# Patient Record
Sex: FEMALE | Race: WHITE
[De-identification: names, ages, dates, MRNs, and addresses within clinical notes are randomized per-mention and may not be internally consistent; named-entity substitution may affect disease eponyms.]

---

## 2017-06-22 ENCOUNTER — HOSPITAL ENCOUNTER (EMERGENCY)
Dept: HOSPITAL 17 - NEPD | Age: 62
LOS: 1 days | Discharge: HOME | End: 2017-06-23
Payer: COMMERCIAL

## 2017-06-22 VITALS
SYSTOLIC BLOOD PRESSURE: 126 MMHG | DIASTOLIC BLOOD PRESSURE: 73 MMHG | HEART RATE: 81 BPM | OXYGEN SATURATION: 97 % | RESPIRATION RATE: 16 BRPM | TEMPERATURE: 98 F
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
## 2017-06-22 DIAGNOSIS — S20.212A: ICD-10-CM

## 2017-06-22 DIAGNOSIS — S02.2XXA: Primary | ICD-10-CM

## 2017-06-22 PROCEDURE — 71010: CPT

## 2017-06-22 PROCEDURE — L0150 CERV SEMI-RIG ADJ MOLDED CHN: HCPCS

## 2017-06-22 PROCEDURE — 72125 CT NECK SPINE W/O DYE: CPT

## 2017-06-22 PROCEDURE — 70486 CT MAXILLOFACIAL W/O DYE: CPT

## 2017-06-22 PROCEDURE — 70450 CT HEAD/BRAIN W/O DYE: CPT

## 2017-06-22 PROCEDURE — 72072 X-RAY EXAM THORAC SPINE 3VWS: CPT

## 2017-06-22 PROCEDURE — 99284 EMERGENCY DEPT VISIT MOD MDM: CPT

## 2017-06-22 NOTE — RADRPT
EXAM DATE/TIME:  06/22/2017 23:14 

 

[facility] COMPARISON:     

No previous studies available for comparison.

 

                     

INDICATIONS :     

Patient fell tonight landing on upper back area. No chest complaints. 

                     

 

MEDICAL HISTORY :     

None.          

 

SURGICAL HISTORY :     

None.   

 

ENCOUNTER:     

Initial                                        

 

ACUITY:     

1 day      

 

PAIN SCORE:     

0/10

 

LOCATION:      

chest 

 

FINDINGS:     

A single view of the chest demonstrates the lungs to be symmetrically aerated without evidence of mas
s, infiltrate or effusion. Minimal linear density left lung base. The cardiomediastinal contours are 
unremarkable.  Osseous structures are intact.

 

CONCLUSION:     

1. Left basilar subsegmental atelectasis.

 

 

 

 Migue Kilpatrick MD on June 22, 2017 at 23:34           

Board Certified Radiologist.

 This report was verified electronically.

## 2017-06-22 NOTE — PD
HPI


Chief Complaint:  Injury


Time Seen by Provider:  22:44


Travel History


International Travel<30 days:  No


Contact w/Intl Traveler<30days:  No


Traveled to known affect area:  No





History of Present Illness


HPI


62-year-old female here for evaluation of nose pain, head pain, neck pain, and 

left lateral thoracic pain after mechanical trip and fall.  The patient was at 

a class for work at [employer] when she states she tripped 

on a speed bump.  She states that she fell forward landing directly onto her 

nose.  She denies LOC.  She is not on any antiplatelets or anticoagulants.  She 

did have a moderate amount of bleeding from her nose.  She is having some pain 

in her lower neck that radiates to her bilateral shoulders and upper back with 

movements.  She was able to ambulate after the fall and drove herself to the 

emergency department.  She denies nausea or vomiting.  No visual disturbances.  

No paresthesias or motor deficits.  Pain is moderate.





PFSH


Past Medical History


Cancer:  No


Cardiovascular Problems:  Yes (MITRAL VALVE PROLAPSE)


Diabetes:  No


Diminished Hearing:  No


Endocrine:  Yes


Gastrointestinal Disorders:  Yes (GI VARICES, CIRRHOSIS)


Genitourinary:  No


Hepatitis:  Yes (HEP C3 - 2001)


Hiatal Hernia:  No


Immune Disorder:  No


Medical other:  Yes (HX ESOPHAGEAL & ABDOMINAL VARICES )


Musculoskeletal:  No


Neurologic:  No


Psychiatric:  No


Respiratory:  No


Thyroid Disease:  Yes ("UNDERACTIVE")


Tetanus Vaccination:  Unknown





Past Surgical History


Abdominal Surgery:  No


AICD:  No


Body Medical Devices:  NONE


Cardiac Surgery:  No


Endocrine Surgery:  No


Eye Surgery:  No


Genitourinary Surgery:  No


Gynecologic Surgery:  No


Joint Replacement:  No


Oral Surgery:  Yes (TONSILLECTOMY )


Pacemaker:  No


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 

DUCT)


Other Surgery:  Yes (PROCEDURE TO REPAIR ESOPHAGEAL & ABDOMINAL VARICES )





Social History


Alcohol Use:  No


Tobacco Use:  Yes (PT 'VAPES')


Substance Use:  No





Allergies-Medications


(Allergen,Severity, Reaction):  


Coded Allergies:  


     No Known Allergies (Unverified , 6/22/17)


Reported Meds & Prescriptions





Reported Meds & Active Scripts


Active


Levothyroxine (Levothyroxine Sodium) 50 Mcg Tab 50 Mcg PO DAILY


Spironolactone 50 Mg Tab 50 Mg PO BIDPC


Furosemide 40 Mg Tab 80 Mg PO BID


Reported


Selenium 50 Mcg Tab 50 Mg PO DAILY


Probiotic & Acidophilus F (Probiotic Product) 1 Cap Cap 1 Cap PO DAILY


Potassium 99 Mg Tab 99 Mg PO TID


Multivitamin Adults (Multiple Vitamins W/ Minerals) 1 Tab 1 Tab PO DAILY


Milk Thistle 175 Mg Cap 175 Mg PO TID


B12-Active (Methylcobalamin) 1 Mg Chew 1 Mg CHEW BID


Metamucil Smooth Texture (Psyllium Hydrophilic Mucilloid) 28.3 % Pow 1 Scoop PO 

HS PRN


     1 rounded TEASPOON in 8 oz of liquid at the first sign of


     irregularity.


Melatonin-Pyridoxine 1-10 Mg Tab 1 Tab PO HS


Lecithin 1,200 Mg Cap   


Glucosamine-Chondroitin (Misc Natural Products) 1 Cap 1 Cap PO TID


Odor Free Garlic (Garlic) 100 Mg Tab   


Fish Oil 1000 mg (Omega-3 Fatty Acids) 1 Cap Cap 1 Cap PO BID


Calcium 500/Vitamin D3 (Calcium Carbonate-Cholecalciferol) 500-400 Mg-Unit Tab 

1 Tab PO BID


Ascorbic Acid 500 Mg Tab 500 Mg PO TID








Review of Systems


Except as stated in HPI:  all other systems reviewed are Neg





Physical Exam


Narrative


GENERAL: Well-developed, well-nourished, awake, alert, GCS 15, no acute 

distress.


SKIN: Focused skin assessment warm/dry.  Mild ecchymosis to bridge of nose.  No 

lacerations or abrasions.


HEAD: Normocephalic.  Mild edema to bridge of nose with overlying ecchymosis 

and diffuse tenderness.  No craniofacial step-offs.


EYES: Pupils equal, round, 3 mm, reactive to light.  EOMI.  No scleral icterus. 

No injection or drainage. 


ENT: Dry blood in right anterior naris.  Mucous membranes pink and moist.


NECK: Trachea midline. No JVD.  There is midline cervical spine tenderness over 

C5 and C6 without step-off.


CARDIOVASCULAR: Regular rate and rhythm.  


RESPIRATORY: No accessory muscle use. Clear to auscultation. Breath sounds 

equal bilaterally. 


GASTROINTESTINAL: Abdomen soft, non-tender, nondistended. 


MUSCULOSKELETAL: No obvious deformities. No clubbing.  No cyanosis.  No edema.  

Left lateral chest wall tenderness without crepitus, without step-off, without 

paradoxical chest wall movement.  Mild midline upper thoracic spine tenderness 

without step-off.  The rest of the patient's T-spine and L-spine spine are 

without step-off or tenderness.  All joints and extremities are without 

deformity, without tenderness, with normal ROM.


NEUROLOGICAL: Awake and alert. No obvious cranial nerve deficits.  Motor 

grossly within normal limits. Normal speech.


PSYCHIATRIC: Appropriate mood and affect; insight and judgment normal.





Data


Data


Last Documented VS





Vital Signs








  Date Time  Temp Pulse Resp B/P Pulse Ox O2 Delivery O2 Flow Rate FiO2


 


6/22/17 22:38   18     


 


6/22/17 21:53 98.0 81  126/73 97 Room Air  








Orders





 Ct Brain W/O Iv Contrast(Rout) (6/22/17 )


Ct Cerv Spine W/O Contrast (6/22/17 )


Ct Facial Bones W/O Iv Cont (6/22/17 )


Chest, Single Ap (6/22/17 )


Spine, Thoracic-Ap/Lat/Sw(3vw) (6/22/17 )


Ibuprofen (Motrin) (6/22/17 23:00)


^ [place] Collar (6/22/17 22:56)


Collar [place] (6/22/17 )








MDM


Medical Decision Making


Medical Screen Exam Complete:  Yes


Emergency Medical Condition:  Yes


Differential Diagnosis


Facial bone fracture, intracranial trauma, cervical spine injury, thoracic 

spine injury


Narrative Course


Initial vital signs show heart rate 81, blood pressure 126/73, pulse ox 97% on 

room air, oral temp of 98F.





Chest x-ray:


Left basilar subsegmental atelectasis.





X-ray thoracic spine:


Slight dextrocurvature otherwise unremarkable thoracic spine.





CT brain:


No acute intracranial disease.





CT facial bones:


CONCLUSION:     


Right nasal fracture.





CT C spine:


CONCLUSION:     


1. No fracture.


2. Degenerative changes from C4-C6.





The patient was made aware of all findings.  She is resting comfortably.  No 

respiratory distress.  She does have some left lateral chest wall tenderness 

without crepitus, without step-off, without paradoxical chest wall movements.  

She refused any pain medication here in the emergency department.  She is 

stable for discharge home with outpatient follow-up with her primary care 

physician as well as maxillofacial surgeon this week.  She was informed on when 

to return to the emergency department.  She verbalizes understanding and 

agreement with plan.























Diagnosis





 Primary Impression:  


 Fall


 Qualified Code:  W19.XXXA - Fall, initial encounter


 Additional Impressions:  


 Nasal bone fracture


 Qualified Code:  S02.2XXA - Closed fracture of nasal bone, initial encounter


 Chest wall contusion


 Qualified Code:  S20.212A - Chest wall contusion, left, initial encounter


Referrals:  


Dago Kwan MD


1 week


ENT





Marquise Wills DMD


3 days


Craniofacial surgeon





Primary Care Physician


3 days





***Additional Instructions:


Follow-up with your primary care physician this week.


Follow-up with craniofacial surgeon Dr. Wills or a craniofacial surgeon of 

your choice this week.


Follow-up with ENT Dr. Kwan or an ENT of your choice this week.


Return to the emergency department for worsening symptoms or any other concerns 

as discussed.


Scripts


Tizanidine 4 Mg Tab4 Mg PO TID  #20 TAB  Ref 0


   Prov:René Mccauley MD         6/23/17 


Tramadol 50 Mg Tab50 Mg PO Q6H PRN (PAIN) #20 TAB  Ref 0


   Prov:René Mccauley MD         6/23/17


Disposition:  01 DISCHARGE HOME


Condition:  Stable








René Mccauley MD Jun 22, 2017 22:56

## 2017-06-22 NOTE — RADRPT
EXAM DATE/TIME:  06/22/2017 23:15 

 

[facility] COMPARISON:     

No previous studies available for comparison.

 

                     

INDICATIONS :     

Patient fell tonight landing on upper back. Complains of thoracic pain.

                     

 

MEDICAL HISTORY :     

None.          

 

SURGICAL HISTORY :     

None.   

 

ENCOUNTER:     

Initial                                        

 

ACUITY:     

1 day      

 

PAIN SCORE:     

7/10

 

LOCATION:       

T-Spine

 

FINDINGS:     

There is normal alignment of the thoracic vertebral bodies.  Vertebral body height is maintained.  No
 evidence of fracture or subluxation. Slight dextrocurvature. Pedicles are intact at all levels.  The
 paravertebral reflections are not thickened. 

 

CONCLUSION:     

Slight dextrocurvature otherwise unremarkable thoracic spine.

 

 

 

 Migue Kilpatrick MD on June 22, 2017 at 23:35           

Board Certified Radiologist.

 This report was verified electronically.

## 2017-06-23 NOTE — RADRPT
EXAM DATE/TIME:  06/22/2017 23:21 

 

[facility] COMPARISON:     

No previous studies available for comparison.

 

 

INDICATIONS :     

Trauma, fall.

                      

 

RADIATION DOSE:     

10.66 CTDIvol (mGy) 

 

 

 

MEDICAL HISTORY :     

None  

 

SURGICAL HISTORY :      

None. 

 

ENCOUNTER:      

Initial

 

ACUITY:      

1 day

 

PAIN SCALE:      

3/10

 

LOCATION:        

neck 

 

TECHNIQUE:     

Volumetric scanning of the cervical spine was performed. Multiplanar reconstructions in the sagittal,
 coronal and oblique axial planes were performed.   Using automated exposure control and adjustment o
f the mA and/or kV according to patient size, radiation dose was kept as low as reasonably achievable
 to obtain optimal diagnostic quality images.   DICOM format image data is available electronically f
or review and comparison.  

 

FINDINGS:     

 

VERTEBRAE:     

Normal vertebral body height.

 

ALIGNMENT:     

Minimal retrolisthesis C4 on C5 and C5 at C6.

 

C2-C3:  

The bony spinal canal is normal in size.  No evidence of disc bulge or herniation.  The neural forami
na are bilaterally patent.

 

C3-C4:  

The bony spinal canal is normal in size.  No evidence of disc bulge or herniation.  The neural forami
na are bilaterally patent.

 

C4-C5: 

Posterior disc osteophyte complex abuts the ventral thecal sac without canal stenosis. Mild neural fo
raminal narrowing bilaterally.

 

C5-C6:  

Posterior disc osteophyte complex abuts the ventral thecal sac without canal stenosis. Mild neural fo
raminal narrowing bilaterally.

 

C6-C7:  

The bony spinal canal is normal in size.  No evidence of disc bulge or herniation.  The neural forami
na are bilaterally patent.

 

C7-T1:  

The bony spinal canal is normal in size.  No evidence of disc bulge or herniation.  The neural forami
na are bilaterally patent.

 

CONCLUSION:     

1. No fracture.

2. Degenerative changes from C4-C6.

 

 

 

 Migue Kilpatrick MD on June 23, 2017 at 0:08           

Board Certified Radiologist.

 This report was verified electronically.

## 2017-06-23 NOTE — RADRPT
EXAM DATE/TIME:  06/22/2017 23:21 

 

[facility] COMPARISON:     

No previous studies available for comparison.

 

 

INDICATIONS :     

Trauma, fall.

                      

 

RADIATION DOSE:     

26.22 CTDIvol (mGy) 

 

 

 

MEDICAL HISTORY :     

Cardiovascular disease.  

 

SURGICAL HISTORY :      

None. 

 

ENCOUNTER:      

Initial

 

ACUITY:      

1 day

 

PAIN SCALE:      

3/10

 

LOCATION:        

cranial 

 

TECHNIQUE:     

Multiple contiguous axial images were obtained of the head.  Using automated exposure control and adj
ustment of the mA and/or kV according to patient size, radiation dose was kept as low as reasonably a
chievable to obtain optimal diagnostic quality images.   DICOM format image data is available electro
nically for review and comparison.  

 

FINDINGS:     

 

CEREBRUM:     

The ventricles are normal for age.  No evidence of midline shift, mass lesion, hemorrhage or acute in
farction.  No extra-axial fluid collections are seen.

 

POSTERIOR FOSSA:     

The cerebellum and brainstem are intact.  The 4th ventricle is midline.  The cerebellopontine angle i
s unremarkable.

 

EXTRACRANIAL:     

The visualized portion of the orbits is intact.

 

SKULL:     

The calvaria is intact.  No evidence of skull fracture.

 

CONCLUSION:     

No acute intracranial disease.  

 

 

 

 Migue Kilpatrick MD on June 23, 2017 at 0:05           

Board Certified Radiologist.

 This report was verified electronically.

## 2017-06-23 NOTE — RADRPT
EXAM DATE/TIME:  06/22/2017 23:21 

 

[facility] COMPARISON:     

No previous studies available for comparison.

 

 

INDICATIONS :     

Trauma, fall. 

                      

 

RADIATION DOSE:     

45.71 CTDIvol (mGy) 

 

 

MEDICAL HISTORY :     

None  

 

SURGICAL HISTORY :      

None. 

 

ENCOUNTER:      

Initial

 

ACUITY:      

1 day

 

PAIN SCORE:      

6/10

 

LOCATION:        

facial 

 

TECHNIQUE:     

Volumetric scanning of the facial bones was performed.  Using automated exposure control and adjustme
nt of the mA and/or kV according to patient size, radiation dose was kept as low as reasonably achiev
able to obtain optimal diagnostic quality images.  DICOM format image data is available electronicall
y for review and comparison.  

 

FINDINGS:     

 

ORBITS:     

The orbital and infraorbital osseous structures are intact.  The retroconal structures have a normal 
configuration.  No radiopaque foreign bodies are seen.

 

NASAL BONE:     

Displaced and slightly depressed right nasal fracture.

 

ZYGOMATIC ARCHES:     

Symmetric without evidence of fracture.

 

SINUSES:     

The maxillary, ethmoid and frontal sinuses are intact.  No air-fluid levels seen.

 

NASAL CAVITY:     

The nasal septum is intact and midline.  The lacrimal ducts are intact.

 

SOFT TISSUES:     

No radiopaque foreign bodies seen.  No soft-tissue swelling is seen.

 

INTRACRANIAL:     

No intracranial air seen.

 

CRIBIFORM PLATE:     

Grossly intact.

 

CONCLUSION:     

Right nasal fracture.

 

 

 

 Migue Kilpatrick MD on June 23, 2017 at 0:10           

Board Certified Radiologist.

 This report was verified electronically.

## 2018-06-02 ENCOUNTER — HOSPITAL ENCOUNTER (OUTPATIENT)
Dept: HOSPITAL 17 - NEPE | Age: 63
Setting detail: OBSERVATION
LOS: 2 days | Discharge: HOME | End: 2018-06-04
Attending: HOSPITALIST | Admitting: HOSPITALIST
Payer: COMMERCIAL

## 2018-06-02 VITALS
HEART RATE: 70 BPM | TEMPERATURE: 97.9 F | DIASTOLIC BLOOD PRESSURE: 68 MMHG | RESPIRATION RATE: 18 BRPM | SYSTOLIC BLOOD PRESSURE: 118 MMHG | OXYGEN SATURATION: 96 %

## 2018-06-02 VITALS
SYSTOLIC BLOOD PRESSURE: 114 MMHG | HEART RATE: 67 BPM | RESPIRATION RATE: 16 BRPM | DIASTOLIC BLOOD PRESSURE: 72 MMHG | OXYGEN SATURATION: 96 %

## 2018-06-02 VITALS
SYSTOLIC BLOOD PRESSURE: 94 MMHG | TEMPERATURE: 98.2 F | RESPIRATION RATE: 17 BRPM | HEART RATE: 69 BPM | OXYGEN SATURATION: 98 % | DIASTOLIC BLOOD PRESSURE: 60 MMHG

## 2018-06-02 VITALS — DIASTOLIC BLOOD PRESSURE: 56 MMHG | SYSTOLIC BLOOD PRESSURE: 96 MMHG

## 2018-06-02 DIAGNOSIS — Z23: ICD-10-CM

## 2018-06-02 DIAGNOSIS — D68.9: ICD-10-CM

## 2018-06-02 DIAGNOSIS — I95.9: ICD-10-CM

## 2018-06-02 DIAGNOSIS — I34.1: ICD-10-CM

## 2018-06-02 DIAGNOSIS — S01.01XA: ICD-10-CM

## 2018-06-02 DIAGNOSIS — R55: Primary | ICD-10-CM

## 2018-06-02 DIAGNOSIS — E03.9: ICD-10-CM

## 2018-06-02 DIAGNOSIS — E86.0: ICD-10-CM

## 2018-06-02 DIAGNOSIS — I85.00: ICD-10-CM

## 2018-06-02 DIAGNOSIS — F17.200: ICD-10-CM
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
## 2018-06-02 DIAGNOSIS — B19.20: ICD-10-CM

## 2018-06-02 LAB
ALBUMIN SERPL-MCNC: 3.6 GM/DL (ref 3.4–5)
ALP SERPL-CCNC: 167 U/L (ref 45–117)
ALT SERPL-CCNC: 32 U/L (ref 10–53)
AST SERPL-CCNC: 45 U/L (ref 15–37)
BASOPHILS # BLD AUTO: 0 TH/MM3 (ref 0–0.2)
BASOPHILS NFR BLD: 0.8 % (ref 0–2)
BILIRUB SERPL-MCNC: 1.4 MG/DL (ref 0.2–1)
BUN SERPL-MCNC: 14 MG/DL (ref 7–18)
CALCIUM SERPL-MCNC: 9 MG/DL (ref 8.5–10.1)
CHLORIDE SERPL-SCNC: 98 MEQ/L (ref 98–107)
CREAT SERPL-MCNC: 0.94 MG/DL (ref 0.5–1)
EOSINOPHIL # BLD: 0.1 TH/MM3 (ref 0–0.4)
EOSINOPHIL NFR BLD: 3.2 % (ref 0–4)
ERYTHROCYTE [DISTWIDTH] IN BLOOD BY AUTOMATED COUNT: 14.3 % (ref 11.6–17.2)
GFR SERPLBLD BASED ON 1.73 SQ M-ARVRAT: 60 ML/MIN (ref 89–?)
GLUCOSE SERPL-MCNC: 110 MG/DL (ref 74–106)
HCO3 BLD-SCNC: 27.2 MEQ/L (ref 21–32)
HCT VFR BLD CALC: 47.1 % (ref 35–46)
HGB BLD-MCNC: 16.4 GM/DL (ref 11.6–15.3)
INR PPP: 1.3 RATIO
LYMPHOCYTES # BLD AUTO: 0.6 TH/MM3 (ref 1–4.8)
LYMPHOCYTES NFR BLD AUTO: 14.3 % (ref 9–44)
MAGNESIUM SERPL-MCNC: 2 MG/DL (ref 1.5–2.5)
MCH RBC QN AUTO: 35.1 PG (ref 27–34)
MCHC RBC AUTO-ENTMCNC: 34.9 % (ref 32–36)
MCV RBC AUTO: 100.5 FL (ref 80–100)
MONOCYTE #: 0.4 TH/MM3 (ref 0–0.9)
MONOCYTES NFR BLD: 9.4 % (ref 0–8)
NEUTROPHILS # BLD AUTO: 2.9 TH/MM3 (ref 1.8–7.7)
NEUTROPHILS NFR BLD AUTO: 72.3 % (ref 16–70)
PLATELET # BLD: 133 TH/MM3 (ref 150–450)
PMV BLD AUTO: 10.4 FL (ref 7–11)
PROT SERPL-MCNC: 7.8 GM/DL (ref 6.4–8.2)
PROTHROMBIN TIME: 13 SEC (ref 9.8–11.6)
RBC # BLD AUTO: 4.68 MIL/MM3 (ref 4–5.3)
SODIUM SERPL-SCNC: 137 MEQ/L (ref 136–145)
TROPONIN I SERPL-MCNC: 0.02 NG/ML (ref 0.02–0.05)
WBC # BLD AUTO: 4 TH/MM3 (ref 4–11)

## 2018-06-02 PROCEDURE — 97530 THERAPEUTIC ACTIVITIES: CPT

## 2018-06-02 PROCEDURE — 71045 X-RAY EXAM CHEST 1 VIEW: CPT

## 2018-06-02 PROCEDURE — 80048 BASIC METABOLIC PNL TOTAL CA: CPT

## 2018-06-02 PROCEDURE — 83735 ASSAY OF MAGNESIUM: CPT

## 2018-06-02 PROCEDURE — 96374 THER/PROPH/DIAG INJ IV PUSH: CPT

## 2018-06-02 PROCEDURE — 84439 ASSAY OF FREE THYROXINE: CPT

## 2018-06-02 PROCEDURE — 99285 EMERGENCY DEPT VISIT HI MDM: CPT

## 2018-06-02 PROCEDURE — 84443 ASSAY THYROID STIM HORMONE: CPT

## 2018-06-02 PROCEDURE — 96361 HYDRATE IV INFUSION ADD-ON: CPT

## 2018-06-02 PROCEDURE — 93306 TTE W/DOPPLER COMPLETE: CPT

## 2018-06-02 PROCEDURE — 80053 COMPREHEN METABOLIC PANEL: CPT

## 2018-06-02 PROCEDURE — 84481 FREE ASSAY (FT-3): CPT

## 2018-06-02 PROCEDURE — 85610 PROTHROMBIN TIME: CPT

## 2018-06-02 PROCEDURE — 72125 CT NECK SPINE W/O DYE: CPT

## 2018-06-02 PROCEDURE — 97163 PT EVAL HIGH COMPLEX 45 MIN: CPT

## 2018-06-02 PROCEDURE — G0378 HOSPITAL OBSERVATION PER HR: HCPCS

## 2018-06-02 PROCEDURE — 82550 ASSAY OF CK (CPK): CPT

## 2018-06-02 PROCEDURE — 85025 COMPLETE CBC W/AUTO DIFF WBC: CPT

## 2018-06-02 PROCEDURE — 90471 IMMUNIZATION ADMIN: CPT

## 2018-06-02 PROCEDURE — 84484 ASSAY OF TROPONIN QUANT: CPT

## 2018-06-02 PROCEDURE — 12002 RPR S/N/AX/GEN/TRNK2.6-7.5CM: CPT

## 2018-06-02 PROCEDURE — 90714 TD VACC NO PRESV 7 YRS+ IM: CPT

## 2018-06-02 PROCEDURE — 70450 CT HEAD/BRAIN W/O DYE: CPT

## 2018-06-02 PROCEDURE — 85730 THROMBOPLASTIN TIME PARTIAL: CPT

## 2018-06-02 PROCEDURE — 93005 ELECTROCARDIOGRAM TRACING: CPT

## 2018-06-02 RX ADMIN — Medication SCH ML: at 21:00

## 2018-06-02 RX ADMIN — STANDARDIZED SENNA CONCENTRATE AND DOCUSATE SODIUM SCH TAB: 8.6; 5 TABLET, FILM COATED ORAL at 21:00

## 2018-06-02 NOTE — RADRPT
EXAM DATE:  6/2/2018 6:02 PM EDT

AGE/SEX:        63 years / Female



INDICATIONS:  Syncopal episode, fell and hit head, nausea, and vomiting.  



CLINICAL DATA:  This is the patient's initial encounter. Patient reports that signs and symptoms have
 been present for 1 day and indicates a pain score of 6/10. 

                                                                          

MEDICAL/SURGICAL HISTORY:   Cardiovascular disease.  Hepatitis C.  Cirrhosis.  Mitral valve prolapse.
 Hypothyroidism.   None.



RADIATION DOSE:  56.35 CTDI (mGy)







COMPARISON:      [facility], CT BRAIN W/O CONTRAST, 6/22/2017.  .





TECHNIQUE:  CT of the head without contrast.  Using automated exposure control and adjustment of the 
mA and/or kV according to patient size, radiation dose was kept as low as reasonably achievable to ob
tain optimal diagnostic quality images.



FINDINGS: 

Cerebrum:  The ventricles are normal for age.  No evidence of midline shift, mass lesion, hemorrhage 
or acute infarction.  No extraaxial fluid collections are seen.

Posterior Fossa:  The cerebellum and brainstem are intact.  The 4th ventricle is midline.  The cerebe
llopontine angle is unremarkable.

Extracranial:  There is a large left parietal scalp hematoma.

Skull:  The calvaria is intact.  No evidence of skull fracture.







CONCLUSION:

1.  No acute intracranial abnormality.

2.  Left parietal scalp hematoma.



Electronically signed by: Leo Jeter MD  6/2/2018 6:05 PM EDT

## 2018-06-02 NOTE — PD
HPI


Chief Complaint:  Syncope/Near-Syncope


Time Seen by Provider:  16:06


Travel History


International Travel<30 days:  No


Contact w/Intl Traveler<30days:  No


Traveled to known affect area:  No





History of Present Illness


HPI


63-year-old female that presents to the ED for evaluation of syncope.  Patient 

had a syncopal episode today while at work.  Per patient she does not remember 

what happened.  She does not know what happened before this.  She woke up on 

the floor and apparently had a significant head injury.  Patient was put on 

cervical collar by [facility].  She was brought here for evaluation.  She states that 

she has had 2 syncopal episode the past and she was told that she had the 

hydration.  She relates that is the same.  She does have a history of thyroid 

disease.  She states having a headache and some neck pain.  Per patient the 

headache is 7 out of 10.  Denies take any blood thinners.  Has not seen anybody 

for this.  No chest pain or shortness of breath.  No urinary or bowel movement 

issues.  No abdominal pain.  No symptoms preloading to the event.  She does not 

know how long she was down.  No fevers chills or sweats.  No other medical 

issues at this time.  She does appear to be bleeding from a injury to the back 

of the head.  Heart disease secondary to significant blood clotting in the area.





PFSH


Past Medical History


Cancer:  No


Cardiovascular Problems:  Yes (MITRAL VALVE PROLAPSE)


Diabetes:  No


Diminished Hearing:  No


Endocrine:  Yes


Gastrointestinal Disorders:  Yes (GI VARICES, CIRRHOSIS)


Hepatitis:  Yes (HEP C3 - 2001)


Hiatal Hernia:  No


Immune Disorder:  No


Medical other:  Yes (HX ESOPHAGEAL & ABDOMINAL VARICES )


Thyroid Disease:  Yes (hypo)


Tetanus Vaccination:  Unknown


Influenza Vaccination:  Yes





Past Surgical History


Abdominal Surgery:  No


AICD:  No


Cardiac Surgery:  No


Endocrine Surgery:  No


Eye Surgery:  No


Genitourinary Surgery:  No


Gynecologic Surgery:  No


Joint Replacement:  No


Oral Surgery:  Yes (TONSILLECTOMY )


Pacemaker:  No


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 

DUCT)


Tonsillectomy:  Yes


Other Surgery:  Yes (BREAST BIOPSY, REPAIR ESOPHAGEAL & ABDOMINAL VARICES )





Social History


Alcohol Use:  Yes (Wine after work)


Tobacco Use:  Yes ("A couple cigarettes a day")


Substance Use:  No





Allergies-Medications


(Allergen,Severity, Reaction):  


Coded Allergies:  


     No Known Allergies (Unverified  Allergy, Unknown, 6/2/18)


Reported Meds & Prescriptions





Reported Meds & Active Scripts


Active


Furosemide 40 Mg Tab 80 Mg PO BID


Tizanidine (Tizanidine HCl) 4 Mg Tab 4 Mg PO TID


Tramadol (Tramadol HCl) 50 Mg Tab 50 Mg PO Q6H PRN


Levothyroxine (Levothyroxine Sodium) 50 Mcg Tab 50 Mcg PO DAILY


Spironolactone 50 Mg Tab 50 Mg PO BIDPC


Reported


Probiotic & Acidophilus F (Probiotic Product) 1 Cap Cap 1 Cap PO DAILY


Metamucil Smooth Texture (Psyllium Hydrophilic Mucilloid) 28.3 % Pow 1 Scoop PO 

HS PRN


     1 rounded TEASPOON in 8 oz of liquid at the first sign of


     irregularity.


Melatonin-Pyridoxine 1-10 Mg Tab 1 Tab PO HS


Glucosamine-Chondroitin (Misc Natural Products) 1 Cap 1 Cap PO TID


Calcium 500/Vitamin D3 (Calcium Carbonate-Cholecalciferol) 500-400 Mg-Unit Tab 

1 Tab PO BID


Ascorbic Acid 500 Mg Tab 500 Mg PO TID








Review of Systems


Except as stated in HPI:  all other systems reviewed are Neg





Physical Exam


Narrative


GENERAL: 


SKIN: Warm and dry.


HEAD: Atraumatic. Normocephalic.  Patient has a significant hematoma to the 

left occipital head.  Tender to touch.  Some bleeding noted.  About a 4 cm 

laceration to the back of the head with some clotting.


EYES: Pupils equal and round. No scleral icterus. No injection or drainage. 


ENT: No nasal bleeding or discharge.  Mucous membranes pink and moist.  Tongue 

is midline.  No uvula deviation.


NECK: Trachea midline. No JVD. 


CARDIOVASCULAR: Regular rate and rhythm.  No murmurs, S3, S4.


RESPIRATORY: No accessory muscle use. Clear to auscultation. Breath sounds 

equal bilaterally. 


GASTROINTESTINAL: Abdomen soft, non-tender, nondistended. Hepatic and splenic 

margins not palpable. 


MUSCULOSKELETAL: Extremities without clubbing, cyanosis, or edema. No obvious 

deformities.  Full range of motion of the upper and lower extremities 

bilaterally.  2+ pulses bilaterally.


NEUROLOGICAL: Awake and alert. No obvious cranial nerve deficits.  Motor 

grossly within normal limits. Five out of 5 muscle strength in the arms and 

legs.  Normal speech.


PSYCHIATRIC: Appropriate mood and affect; insight and judgment normal.





Data


Data


Last Documented VS





Vital Signs








  Date Time  Temp Pulse Resp B/P (MAP) Pulse Ox O2 Delivery O2 Flow Rate FiO2


 


6/2/18 18:00  67 16 114/72 (86) 96 Room Air  


 


6/2/18 16:01 97.9       








Orders





 Orders


Electrocardiogram (6/2/18 16:12)


Complete Blood Count With Diff (6/2/18 16:12)


Comprehensive Metabolic Panel (6/2/18 16:12)


Ckmb (Isoenzyme) Profile (6/2/18 16:12)


Troponin I (6/2/18 16:12)


Prothrombin Time / Inr (Pt) (6/2/18 16:12)


Act Partial Throm Time (Ptt) (6/2/18 16:12)


Magnesium (Mg) (6/2/18 16:12)


Thyroid Stimulating Hormone (6/2/18 16:12)


Chest, Single Ap (6/2/18 16:12)


Ct Brain W/O Iv Contrast(Rout) (6/2/18 16:12)


Iv Access Insert/Monitor (6/2/18 16:12)


Ecg Monitoring (6/2/18 16:12)


Oximetry (6/2/18 16:12)


Orthostatic Vital Signs (6/2/18 16:12)


Ct Cerv Spine W/O Contrast (6/2/18 )


Sodium Chlor 0.9% 1000 Ml Inj (Ns 1000 M (6/2/18 16:15)


Metoclopramide Inj (Reglan Inj) (6/2/18 16:15)


Tetanus/Diphtheria Tox Adult (Tetanus/Di (6/2/18 16:15)


Wound Care (6/2/18 16:27)


Lidocaine Pf 1% Inj (Xylocaine-Mpf 1% In (6/2/18 18:00)


Place In Observation (6/2/18 )


Vital Signs (Adult) Q4H (6/2/18 18:45)


Cardiac Monitor / Telemetry .CONTINUOUS (6/2/18 18:45)


Sodium Chloride 0.9% Flush (Ns Flush) (6/2/18 18:45)


Sodium Chloride 0.9% Flush (Ns Flush) (6/2/18 21:00)


Naloxone Inj (Narcan Inj) (6/2/18 18:45)


Echo 2d Comp With Doppler (6/2/18 )


Vital Signs (Adult) Q4H (6/2/18 19:02)


Activity Oob With Assistance (6/2/18 19:02)


Cardiac Monitor / Telemetry .CONTINUOUS (6/2/18 19:02)


Intake + Output TEOFILO.QSHIFT (6/2/18 19:02)


Diet Heart Healthy (6/3/18 Breakfast)


Sodium Chlor 0.9% 1000 Ml Inj (Ns 1000 M (6/2/18 19:02)


Sodium Chloride 0.9% Flush (Ns Flush) (6/2/18 19:15)


Sodium Chloride 0.9% Flush (Ns Flush) (6/2/18 21:00)


Comprehensive Metabolic Panel (6/3/18 06:00)


Complete Blood Count With Diff (6/3/18 06:00)


Troponin I (6/3/18 00:00)


Troponin I (6/3/18 06:00)


Prothrombin Time / Inr (Pt) (6/3/18 06:00)


Scd Bilateral/Knee High TEOFILO.BID (6/2/18 19:02)


Jethro Bilateral/Knee High TEOFILO.QSHIFT (6/2/18 19:03)


Acetaminophen (Tylenol) (6/2/18 19:15)


Oxycodone (Roxicodone) (6/2/18 19:15)


Oxycodone (Roxicodone) (6/2/18 19:15)


Docusate Sodium-Senna (Ingris-Colace) (6/2/18 21:00)


Magnesium Hydroxide Liq (Milk Of Magnesi (6/2/18 19:15)


Sennosides (Senokot) (6/2/18 19:15)


Bisacodyl Supp (Dulcolax Supp) (6/2/18 19:15)


Lactulose Liq (Lactulose Liq) (6/2/18 19:15)


Physician Name Changes (6/2/18 )


Sodium Chlor 0.9% 1000 Ml Inj (Ns 1000 M (6/2/18 19:15)


Admit Order (Ed Use Only) (6/2/18 19:16)





Labs





Laboratory Tests








Test


  6/2/18


16:15 6/2/18


17:05


 


White Blood Count 4.0 TH/MM3  


 


Red Blood Count 4.68 MIL/MM3  


 


Hemoglobin 16.4 GM/DL  


 


Hematocrit 47.1 %  


 


Mean Corpuscular Volume 100.5 FL  


 


Mean Corpuscular Hemoglobin 35.1 PG  


 


Mean Corpuscular Hemoglobin


Concent 34.9 % 


  


 


 


Red Cell Distribution Width 14.3 %  


 


Platelet Count 133 TH/MM3  


 


Mean Platelet Volume 10.4 FL  


 


Neutrophils (%) (Auto) 72.3 %  


 


Lymphocytes (%) (Auto) 14.3 %  


 


Monocytes (%) (Auto) 9.4 %  


 


Eosinophils (%) (Auto) 3.2 %  


 


Basophils (%) (Auto) 0.8 %  


 


Neutrophils # (Auto) 2.9 TH/MM3  


 


Lymphocytes # (Auto) 0.6 TH/MM3  


 


Monocytes # (Auto) 0.4 TH/MM3  


 


Eosinophils # (Auto) 0.1 TH/MM3  


 


Basophils # (Auto) 0.0 TH/MM3  


 


CBC Comment DIFF FINAL  


 


Differential Comment   


 


Prothrombin Time 13.0 SEC  


 


Prothromb Time International


Ratio 1.3 RATIO 


  


 


 


Activated Partial


Thromboplast Time 27.6 SEC 


  


 


 


Blood Urea Nitrogen  14 MG/DL 


 


Creatinine  0.94 MG/DL 


 


Random Glucose  110 MG/DL 


 


Total Protein  7.8 GM/DL 


 


Albumin  3.6 GM/DL 


 


Calcium Level  9.0 MG/DL 


 


Magnesium Level  2.0 MG/DL 


 


Alkaline Phosphatase  167 U/L 


 


Aspartate Amino Transf


(AST/SGOT) 


  45 U/L 


 


 


Alanine Aminotransferase


(ALT/SGPT) 


  32 U/L 


 


 


Total Bilirubin  1.4 MG/DL 


 


Sodium Level  137 MEQ/L 


 


Potassium Level  3.7 MEQ/L 


 


Chloride Level  98 MEQ/L 


 


Carbon Dioxide Level  27.2 MEQ/L 


 


Anion Gap  12 MEQ/L 


 


Estimat Glomerular Filtration


Rate 


  60 ML/MIN 


 


 


Total Creatine Kinase  95 U/L 


 


Troponin I  0.02 NG/ML 


 


Thyroid Stimulating Hormone


3rd Gen 


  83.800 uIU/ML 


 











MDM


Medical Decision Making


Medical Screen Exam Complete:  Yes


Emergency Medical Condition:  Yes


Medical Record Reviewed:  Yes


Interpretation(s)


CBC & BMP Diagram


6/2/18 16:15








6/2/18 17:05








Total Protein 7.8, Albumin 3.6, Calcium Level 9.0, Magnesium Level 2.0, 

Alkaline Phosphatase 167 H, Aspartate Amino Transf (AST/SGOT) 45 H, Alanine 

Aminotransferase (ALT/SGPT) 32, Total Bilirubin 1.4 H








Last Impressions








Head CT 6/2/18 1612 Signed





Impressions: 





 CONCLUSION:





 1.  No acute intracranial abnormality.





 2.  Left parietal scalp hematoma.





  





 


 


Chest X-Ray 6/2/18 1612 Signed





Impressions: 





 CONCLUSION: 





 1.  Minimal bibasilar atelectasis and/or scarring.





 2.  No acute focal infiltrate or pulmonary vascular congestion.





 3.  Mild degenerative changes and scoliosis of the thoracic spine.





  





 


 


Cervical Spine CT 6/2/18 0000 Signed





Impressions: 





 CONCLUSION:





 1.  No acute fracture or prevertebral soft tissue swelling.





 2.  Mild spinal stenosis and moderate bilateral foraminal narrowing at C5-6.





 3.  Moderate bilateral foraminal narrowing at C4-5 and C6-7 without spinal sten





 osis.





 4.  Straightening of normal cervical lordosis.





 5.  Scoliosis of the cervical spine.





 6.  Cervical spondylosis at C4-5, C5-6 and C6-7.





  





 





troponin and CKMB negative


EKG shows sinus rhythm with no sign of acute ischemia or arrhythmia read by me 

and attending.


Differential Diagnosis


Syncope versus head injury versus fall versus laceration versus head bleed


Narrative Course


63-year-old female that presents to the ED for evaluation of syncope and head 

injury.  Patient was properly examined and was found to have signs and symptoms 

concerning for significant syncope.  Labs and imaging order.  Labs and imaging 

were essentially unremarkable other than for hematoma.  After explained 

procedure to the patient and she agreed to it laceration was repaired stating 

procedure note.  I am concerned about the patient's syncope.  She initially did 

not want to stay.  Her my attending Dr. Jones evaluate her who had a long 

discussion with the patient and she agreed to stay.  Patient was admitted to 

Dr. Coulter who agrees to admission for further evaluation of syncope.





Diagnosis





 Primary Impression:  


 Syncope


 Qualified Codes:  R55 - Syncope and collapse


 Additional Impressions:  


 Head injury, acute


 Qualified Codes:  S09.90XA - Unspecified injury of head, initial encounter


 Hypothyroidism


 Qualified Codes:  E03.9 - Hypothyroidism, unspecified


 Laceration





Admitting Information


Admitting Physician Requests:  Observation











Jareth Macias Jun 2, 2018 19:30

## 2018-06-02 NOTE — RADRPT
EXAM DATE:  6/2/2018 6:03 PM EDT

AGE/SEX:        63 years / Female



INDICATIONS:  Syncopal episode, fell and hit head.



CLINICAL DATA:  This is the patient's initial encounter. Patient reports that signs and symptoms have
 been present for 1 day and indicates a pain score of 6/10. 

                                                                          

MEDICAL/SURGICAL HISTORY:       Cardiovascular disease.  Hepatitis C.  Cirrhosis.  Mitral valve prola
pse.  None.



RADIATION DOSE:  13.72 CTDI (mGy)







COMPARISON:      [facility] [place], CT CERVICAL SPINE W/O CONTRAST, 6/22/2017.  .



TECHNIQUE:  Contiguous axial images were obtained using helical multirow detector technique.  The vol
umetric data was post-processed with multiplanar reconstruction in oblique axial, sagittal, and coron
al planes. Using automated exposure control and adjustment of the mA and/or kV according to patient s
ize, radiation dose was kept as low as reasonably achievable to obtain optimal diagnostic quality rowena
ges.



FINDINGS: 

No acute fracture or prevertebral soft tissue swelling is noted. There is straightening of the normal
 cervical lordosis. Cervical spondylosis is noted at C4-5, C5-6 and C6-7. Moderate bilateral foramina
l narrowing is noted at C4-5, C5-6 and C6-7. Mild spinal stenosis is noted at C5-6. Scoliosis is note
d throughout the cervical spine.



C2-3:  The bony spinal canal is normal in size.  No evidence of disc bulge or herniation.  The neural
 foramina are bilaterally patent.

C3-4:  The bony spinal canal is normal in size.  No evidence of disc bulge or herniation.  The neural
 foramina are bilaterally patent.

C4-5:  Moderate bilateral foraminal narrowing is noted. No spinal stenosis or focal disc herniation i
s noted.

C5-6:  Mild spinal stenosis and moderate bilateral foraminal narrowing is noted. No focal disc hernia
tion is noted.

C6-7:  Moderate bilateral foraminal narrowing is noted. No spinal stenosis or focal disc herniation i
s noted.

C7-T1:  The bony spinal canal is normal in size.  No evidence of disc bulge or herniation.  The neura
l foramina are bilaterally patent.



CONCLUSION:

1.  No acute fracture or prevertebral soft tissue swelling.

2.  Mild spinal stenosis and moderate bilateral foraminal narrowing at C5-6.

3.  Moderate bilateral foraminal narrowing at C4-5 and C6-7 without spinal stenosis.

4.  Straightening of normal cervical lordosis.

5.  Scoliosis of the cervical spine.

6.  Cervical spondylosis at C4-5, C5-6 and C6-7.



Electronically signed by: Checo Patino MD  6/2/2018 6:16 PM EDT

## 2018-06-02 NOTE — HHI.HP
__________________________________________________





HPI


Service


[facility]


Primary Care Physician


No Primary Care Physician


Admission Diagnosis





Diagnoses:  


(1) Syncope


Diagnosis:  Principal





(2) Head injury, acute


Diagnosis:  Principal





(3) Coagulopathy


Diagnosis:  Principal





(4) Hepatitis C


Diagnosis:  Principal





(5) Hypothyroidism


Diagnosis:  Principal





Travel History


International Travel<30 Days:  No


Contact w/Intl Traveler <30 Da:  No


Traveled to Known Affected Are:  No


History of Present Illness


This is a 63-year-old female with a PMH of Hepatitis C, Cirrhosis, Varices, MVP 

and Hypothyroidism who was brought to the ER by EMS secondary to syncopal 

episode.  Patient states she works at [employer], was at work 

earlier today and had acute onset of dizziness, states she finished up her 

shift and while in the break room had another episode of dizziness followed by 

acute syncopal event.  No seizure activity reported.  Denies c/o chest pain, SOB

, fever, chills, nausea, vomiting or diarrhea.  Does note h/o Hepatitis C, 

successfully treated w/ Interferon "decades ago", on Lasix 80mg bid and 

Aldactone 50mg bid.  Also notes she was previously on Synthroid 75mcg qd but 

had dosage decreased to 50mcg, currently no PCP to follow up with.  On arrival, 

/68, HR 70, O2 sat 96% on RA, Afebrile.  Hemoglobin 16.4, no previous 

labs for comparison.  Platelets 133.  Chemistry at baseline.  TSH 83 INR 1.3.  

CT Head with no acute findings, left parietal scalp hematoma.  CT C-spine no 

acute fracture or soft tissue swelling CXR minimal bibasilar atelectasis, no 

focal infiltrate or pulmonary vascular congestion.  +Head laceration, s/p 

suture repair in ER.





Review of Systems


Except as stated in HPI:  all other systems reviewed are Neg


ROS: 14 point review of systems otherwise negative.





Past Family Social History


Past Medical History


PMH:  Hepatitis C, Cirrhosis, Varices, MVP and Hypothyroidism


Past Surgical History


PAST SURGICAL HISTORY: Tonsillectomy, Breast Biopsy


Allergies:  


Coded Allergies:  


     No Known Allergies (Unverified  Allergy, Unknown, 18)


Family History


PAST FAMILY HISTORY:  Reviewed.  No h/o DM or CAD


Social History


PAST SOCIAL HISTORY: Occasional alcohol.  Positive for tobacco.  Negative for 

drugs per





Physical Exam


Vital Signs





Vital Signs








  Date Time  Temp Pulse Resp B/P (MAP) Pulse Ox O2 Delivery O2 Flow Rate FiO2


 


18 18:00  67 16 114/72 (86) 96 Room Air  


 


18 16:01 97.9 70 18 118/68 (85) 96   








Physical Exam


PE:


GENERAL: Very pleasant middle-aged white female in no acute distress.


HEENT: PERRLA, EOMI. No scleral icterus or conjunctival pallor. No lid lag or 

facial droop. Posterior scalp hematoma, s/p suture repair, bandage in place. 


CARDIOVASCULAR: Regular rate and rhythm.  No obvious murmurs to auscultation. 

No chest tenderness to palpation. 


RESPIRATORY: No obvious rhonchi or wheezing. Clear to auscultation. Breath 

sounds equal bilaterally. 


GASTROINTESTINAL: Abdomen soft, non-tender, nondistended. BS normal. 


MUSCULOSKELETAL: Extremities without clubbing, cyanosis, or edema. No obvious 

deformities. 


NEUROLOGICAL: Awake, alert and oriented x4. No focal neurologic deficits. 

Moving both upper and lower extremities spontaneously.


Laboratory





Laboratory Tests








Test


  18


16:15 18


17:05


 


White Blood Count 4.0  


 


Red Blood Count 4.68  


 


Hemoglobin 16.4  


 


Hematocrit 47.1  


 


Mean Corpuscular Volume 100.5  


 


Mean Corpuscular Hemoglobin 35.1  


 


Mean Corpuscular Hemoglobin


Concent 34.9 


  


 


 


Red Cell Distribution Width 14.3  


 


Platelet Count 133  


 


Mean Platelet Volume 10.4  


 


Neutrophils (%) (Auto) 72.3  


 


Lymphocytes (%) (Auto) 14.3  


 


Monocytes (%) (Auto) 9.4  


 


Eosinophils (%) (Auto) 3.2  


 


Basophils (%) (Auto) 0.8  


 


Neutrophils # (Auto) 2.9  


 


Lymphocytes # (Auto) 0.6  


 


Monocytes # (Auto) 0.4  


 


Eosinophils # (Auto) 0.1  


 


Basophils # (Auto) 0.0  


 


CBC Comment DIFF FINAL  


 


Differential Comment   


 


Prothrombin Time 13.0  


 


Prothromb Time International


Ratio 1.3 


  


 


 


Activated Partial


Thromboplast Time 27.6 


  


 


 


Blood Urea Nitrogen  14 


 


Creatinine  0.94 


 


Random Glucose  110 


 


Total Protein  7.8 


 


Albumin  3.6 


 


Calcium Level  9.0 


 


Magnesium Level  2.0 


 


Alkaline Phosphatase  167 


 


Aspartate Amino Transf


(AST/SGOT) 


  45 


 


 


Alanine Aminotransferase


(ALT/SGPT) 


  32 


 


 


Total Bilirubin  1.4 


 


Sodium Level  137 


 


Potassium Level  3.7 


 


Chloride Level  98 


 


Carbon Dioxide Level  27.2 


 


Anion Gap  12 


 


Estimat Glomerular Filtration


Rate 


  60 


 


 


Total Creatine Kinase  95 


 


Troponin I  0.02 


 


Thyroid Stimulating Hormone


3rd Gen 


  83.800 


 








Result Diagram:  


18 1615                                                                    

            18 1705








Caprini VTE Risk Assessment


Caprini VTE Risk Assessment:  No/Low Risk (score <= 1)


VTE Pharm Contraindication:  High risk for bleeding


Caprini Risk Assessment Model











 Point Value = 1          Point Value = 2  Point Value = 3  Point Value = 5


 


Age 41-60


Minor surgery


BMI > 25 kg/m2


Swollen legs


Varicose veins


Pregnancy or postpartum


History of unexplained or recurrent


   spontaneous 


Oral contraceptives or hormone


   replacement


Sepsis (< 1 month)


Serious lung disease, including


   pneumonia (< 1 month)


Abnormal pulmonary function


Acute myocardial infarction


Congestive heart failure (< 1 month)


History of inflammatory bowel disease


Medical patient at bed rest Age 61-74


Arthroscopic surgery


Major open surgery (> 45 min)


Laparoscopic surgery (> 45 min)


Malignancy


Confined to bed (> 72 hours)


Immobilizing plaster cast


Central venous access Age >= 75


History of VTE


Family history of VTE


Factor V Leiden


Prothrombin 82242N


Lupus anticoagulant


Anticardiolipin antibodies


Elevated serum homocysteine


Heparin-induced thrombocytopenia


Other congenital or acquired


   thrombophilia Stroke (< 1 month)


Elective arthroplasty


Hip, pelvis, or leg fracture


Acute spinal cord injury (< 1 month)








Prophylaxis Regimen











   Total Risk


Factor Score Risk Level Prophylaxis Regimen


 


0-1      Low Early ambulation


 


2 Moderate Order ONE of the following:


*Sequential Compression Device (SCD)


*Heparin 5000 units SQ BID


 


3-4 Higher Order ONE of the following medications:


*Heparin 5000 units SQ TID


*Enoxaparin/Lovenox 40 mg SQ daily (WT < 150 kg, CrCl > 30 mL/min)


*Enoxaparin/Lovenox 30 mg SQ daily (WT < 150 kg, CrCl > 10-29 mL/min)


*Enoxaparin/Lovenox 30 mg SQ BID (WT < 150 kg, CrCl > 30 mL/min)


AND/OR


*Sequential Compression Device (SCD)


 


5 or more Highest Order ONE of the following medications:


*Heparin 5000 units SQ TID (Preferred with Epidurals)


*Enoxaparin/Lovenox 40 mg SQ daily (WT < 150 kg, CrCl > 30 mL/min)


*Enoxaparin/Lovenox 30 mg SQ daily (WT < 150 kg, CrCl > 10-29 mL/min)


*Enoxaparin/Lovenox 30 mg SQ BID (WT < 150 kg, CrCl > 30 mL/min)


AND


*Sequential Compression Device (SCD)











Assessment and Plan


Problem List:  


(1) Syncope


ICD Code:  R55 - Syncope and collapse


Status:  Acute


(2) Head injury, acute


ICD Code:  S09.90XA - Unspecified injury of head, initial encounter


Status:  Acute


(3) Coagulopathy


ICD Code:  D68.9 - Coagulation defect, unspecified


(4) Hepatitis C


ICD Code:  B19.20 - Unspecified viral hepatitis C without hepatic coma


(5) Hypothyroidism


ICD Code:  E03.9 - Hypothyroidism, unspecified


Assessment and Plan


A/P:


1.  Syncope:  acute dizziness followed by syncopal event, possibly due to 

dehydration from high dose diuretic therapy, monitor I/O, admit for Observation

, Telemetry, initial trop negative, will check serial cardiac enzymes to eval 

for possible ischemia.  Check Echo to eval for valvular abnormality/outlet 

obstruction.  IVF for hydration.  Meclizine prn for dizziness, PT for eval/tx. 


2.  Head Injury:  secondary to syncope/fall.  CT Head w/ no acute findings, CT C

-Spine w/ no acute fracture, images reviewed by me.  S/p suture repair in ER.  

Hold any ASA/Anticoagulation. 


3.  Coagulopathy:  INR 1.3, h/o Hepatitis C, successfully treated per patient, 

recheck INR in am in light of recent injury. 


4.  Hepatitis C:  s/p Interferon treatment, currently on Lasix/Aldactone, will 

hold for now in light of syncope/dehydration, monitor I/O, consider lowering 

Lasix 80mg bid


5.  Hypothyroidism:  TSH elevated, previously on Synthroid 75mcg, lowered to 

50mcg however pending new PCP follow up.  Will check T3/T4, increase Synthroid 

to 75mcg, outpatient follow up w/ PCP for lab recheck.


6.  DVT Prophylaxis:  Pharmacologic contraindication due to coagulopathy


7.  Social work for d/c planning as needed. 


8.  Case discussed w/ ER physician at length, labs/records/imaging reviewed by 

me.





Problem Qualifiers





(1) Syncope:  


Qualified Codes:  R55 - Syncope and collapse


(2) Head injury, acute:  


Qualified Codes:  S09.90XA - Unspecified injury of head, initial encounter








Maribel Coulter MD 2018 19:04

## 2018-06-02 NOTE — RADRPT
EXAM DATE:  6/2/2018 4:44 PM EDT

AGE/SEX:        63 years / Female



INDICATIONS:  Syncopal episode and shortness of breath. 



CLINICAL DATA:  This is the patient's initial encounter. Patient reports that signs and symptoms have
 been present for 1 day and indicates a pain score of 0/10. 

                                                                          

MEDICAL/SURGICAL HISTORY:       None. None.



COMPARISON:      Previous chest x-ray dated 6/22/2017 is used for comparison..



FINDINGS:  

Minimal bibasilar atelectasis and/or scarring is noted. No focal alveolar consolidation is noted. No 
pulmonary edema is noted. The heart is stable. Mild degenerative changes and scoliosis of the thoraci
c spine are noted.



CONCLUSION: 

1.  Minimal bibasilar atelectasis and/or scarring.

2.  No acute focal infiltrate or pulmonary vascular congestion.

3.  Mild degenerative changes and scoliosis of the thoracic spine.



Electronically signed by: Checo Patino MD  6/2/2018 5:03 PM EDT

## 2018-06-03 VITALS — SYSTOLIC BLOOD PRESSURE: 87 MMHG | DIASTOLIC BLOOD PRESSURE: 54 MMHG

## 2018-06-03 VITALS
OXYGEN SATURATION: 97 % | RESPIRATION RATE: 18 BRPM | HEART RATE: 65 BPM | DIASTOLIC BLOOD PRESSURE: 52 MMHG | TEMPERATURE: 98.3 F | SYSTOLIC BLOOD PRESSURE: 87 MMHG

## 2018-06-03 VITALS
RESPIRATION RATE: 17 BRPM | TEMPERATURE: 98.2 F | OXYGEN SATURATION: 97 % | DIASTOLIC BLOOD PRESSURE: 47 MMHG | SYSTOLIC BLOOD PRESSURE: 83 MMHG | HEART RATE: 67 BPM

## 2018-06-03 VITALS
DIASTOLIC BLOOD PRESSURE: 45 MMHG | OXYGEN SATURATION: 98 % | RESPIRATION RATE: 20 BRPM | TEMPERATURE: 98.2 F | SYSTOLIC BLOOD PRESSURE: 83 MMHG

## 2018-06-03 VITALS
HEART RATE: 60 BPM | OXYGEN SATURATION: 97 % | TEMPERATURE: 98.4 F | DIASTOLIC BLOOD PRESSURE: 62 MMHG | SYSTOLIC BLOOD PRESSURE: 86 MMHG | RESPIRATION RATE: 15 BRPM

## 2018-06-03 VITALS
SYSTOLIC BLOOD PRESSURE: 78 MMHG | DIASTOLIC BLOOD PRESSURE: 51 MMHG | OXYGEN SATURATION: 94 % | HEART RATE: 65 BPM | TEMPERATURE: 98.1 F | RESPIRATION RATE: 17 BRPM

## 2018-06-03 VITALS
SYSTOLIC BLOOD PRESSURE: 80 MMHG | HEART RATE: 64 BPM | DIASTOLIC BLOOD PRESSURE: 53 MMHG | RESPIRATION RATE: 18 BRPM | OXYGEN SATURATION: 97 % | TEMPERATURE: 98.2 F

## 2018-06-03 LAB
ALBUMIN SERPL-MCNC: 2.4 GM/DL (ref 3.4–5)
ALP SERPL-CCNC: 100 U/L (ref 45–117)
ALT SERPL-CCNC: 24 U/L (ref 10–53)
AST SERPL-CCNC: 33 U/L (ref 15–37)
BASOPHILS # BLD AUTO: 0 TH/MM3 (ref 0–0.2)
BASOPHILS NFR BLD: 0.4 % (ref 0–2)
BILIRUB SERPL-MCNC: 1.4 MG/DL (ref 0.2–1)
BUN SERPL-MCNC: 13 MG/DL (ref 7–18)
CALCIUM SERPL-MCNC: 7.6 MG/DL (ref 8.5–10.1)
CHLORIDE SERPL-SCNC: 107 MEQ/L (ref 98–107)
CREAT SERPL-MCNC: 0.68 MG/DL (ref 0.5–1)
EOSINOPHIL # BLD: 0.1 TH/MM3 (ref 0–0.4)
EOSINOPHIL NFR BLD: 2.9 % (ref 0–4)
ERYTHROCYTE [DISTWIDTH] IN BLOOD BY AUTOMATED COUNT: 13.8 % (ref 11.6–17.2)
GFR SERPLBLD BASED ON 1.73 SQ M-ARVRAT: 87 ML/MIN (ref 89–?)
GLUCOSE SERPL-MCNC: 72 MG/DL (ref 74–106)
HCO3 BLD-SCNC: 21.8 MEQ/L (ref 21–32)
HCT VFR BLD CALC: 37.3 % (ref 35–46)
HGB BLD-MCNC: 12.9 GM/DL (ref 11.6–15.3)
INR PPP: 1.3 RATIO
LYMPHOCYTES # BLD AUTO: 0.3 TH/MM3 (ref 1–4.8)
LYMPHOCYTES NFR BLD AUTO: 9.8 % (ref 9–44)
MCH RBC QN AUTO: 34.8 PG (ref 27–34)
MCHC RBC AUTO-ENTMCNC: 34.7 % (ref 32–36)
MCV RBC AUTO: 100.3 FL (ref 80–100)
MONOCYTE #: 0.4 TH/MM3 (ref 0–0.9)
MONOCYTES NFR BLD: 12.8 % (ref 0–8)
NEUTROPHILS # BLD AUTO: 2.6 TH/MM3 (ref 1.8–7.7)
NEUTROPHILS NFR BLD AUTO: 74.1 % (ref 16–70)
PLATELET # BLD: 96 TH/MM3 (ref 150–450)
PMV BLD AUTO: 9.9 FL (ref 7–11)
PROT SERPL-MCNC: 5.6 GM/DL (ref 6.4–8.2)
PROTHROMBIN TIME: 13.5 SEC (ref 9.8–11.6)
RBC # BLD AUTO: 3.72 MIL/MM3 (ref 4–5.3)
SODIUM SERPL-SCNC: 141 MEQ/L (ref 136–145)
T3FREE SERPL-MCNC: 1.22 PG/ML (ref 2.18–3.98)
T4 FREE SERPL-MCNC: 0.81 NG/DL (ref 0.76–1.46)
TROPONIN I SERPL-MCNC: 0.03 NG/ML (ref 0.02–0.05)
WBC # BLD AUTO: 3.4 TH/MM3 (ref 4–11)

## 2018-06-03 RX ADMIN — STANDARDIZED SENNA CONCENTRATE AND DOCUSATE SODIUM SCH TAB: 8.6; 5 TABLET, FILM COATED ORAL at 20:39

## 2018-06-03 RX ADMIN — STANDARDIZED SENNA CONCENTRATE AND DOCUSATE SODIUM SCH TAB: 8.6; 5 TABLET, FILM COATED ORAL at 09:00

## 2018-06-03 RX ADMIN — LEVOTHYROXINE SODIUM SCH MCG: 75 TABLET ORAL at 04:59

## 2018-06-03 RX ADMIN — PHENYTOIN SODIUM SCH MLS/HR: 50 INJECTION INTRAMUSCULAR; INTRAVENOUS at 00:40

## 2018-06-03 RX ADMIN — ACETAMINOPHEN PRN MG: 325 TABLET ORAL at 04:59

## 2018-06-03 RX ADMIN — Medication SCH ML: at 09:00

## 2018-06-03 RX ADMIN — PHENYTOIN SODIUM SCH MLS/HR: 50 INJECTION INTRAMUSCULAR; INTRAVENOUS at 04:07

## 2018-06-03 RX ADMIN — Medication SCH ML: at 20:39

## 2018-06-03 RX ADMIN — PHENYTOIN SODIUM SCH MLS/HR: 50 INJECTION INTRAMUSCULAR; INTRAVENOUS at 15:02

## 2018-06-03 NOTE — HHI.PR
Subjective


Remarks


Follow up syncope. BP has been low. Patient still reporting dizziness when 

sitting or standing. No nausea/vomiting. No chest pain or dyspnea. No vision 

changes. She has a frontal headache.





Objective


Vitals





Vital Signs








  Date Time  Temp Pulse Resp B/P (MAP) Pulse Ox O2 Delivery O2 Flow Rate FiO2


 


6/3/18 12:00 98.1 65 17 78/51 (60) 94   


 


6/3/18 08:29 98.2 64 18 80/53 (62) 97   


 


6/3/18 03:56 98.2 67 17 83/47 (59) 97   


 


6/3/18 01:11    87/54 (65)    


 


6/3/18 00:04 98.3 65 18 87/52 (64) 97   


 


6/2/18 20:32  67 16 96/56 (69) 97   


 


6/2/18 20:30 98.2 69 17 94/60 (71) 98   


 


6/2/18 18:00  67 16 114/72 (86) 96 Room Air  


 


6/2/18 16:01 97.9 70 18 118/68 (85) 96   














I/O      


 


 6/2/18 6/2/18 6/2/18 6/3/18 6/3/18 6/3/18





 07:00 15:00 23:00 07:00 15:00 23:00


 


Intake Total    2200 ml  


 


Balance    2200 ml  


 


      


 


Intake IV Total    2200 ml  


 


# Voids    2  








Result Diagram:  


6/3/18 0424                                                                    

            6/3/18 0424





Imaging





Last Impressions








Head CT 6/2/18 1612 Signed





Impressions: 





 CONCLUSION:





 1.  No acute intracranial abnormality.





 2.  Left parietal scalp hematoma.





  





 


 


Chest X-Ray 6/2/18 1612 Signed





Impressions: 





 CONCLUSION: 





 1.  Minimal bibasilar atelectasis and/or scarring.





 2.  No acute focal infiltrate or pulmonary vascular congestion.





 3.  Mild degenerative changes and scoliosis of the thoracic spine.





  





 


 


Cervical Spine CT 6/2/18 0000 Signed





Impressions: 





 CONCLUSION:





 1.  No acute fracture or prevertebral soft tissue swelling.





 2.  Mild spinal stenosis and moderate bilateral foraminal narrowing at C5-6.





 3.  Moderate bilateral foraminal narrowing at C4-5 and C6-7 without spinal sten





 osis.





 4.  Straightening of normal cervical lordosis.





 5.  Scoliosis of the cervical spine.





 6.  Cervical spondylosis at C4-5, C5-6 and C6-7.





  





 








Objective Remarks


General: No acute distress.


HEENT: Scalp laceration sutured.


Heart: Regular rate and rhythm. 2/6 murmur.


Lungs: Clear to auscultation bilaterally. No wheezes, rales, or rhonchi. 

Breathing is nonlabored.


Abdomen: Soft, nontender, nondistended.


Extremities: No lower extremity edema.


Psych: Alert and oriented.


Neuro: Normal speech.  No focal deficits noted.


Procedures


none


Urinary Catheter:  No


Vascular Central Line Catheter:  No





A/P


Problem List:  


(1) Syncope


ICD Code:  R55 - Syncope and collapse


Status:  Acute


(2) Head injury, acute


ICD Code:  S09.90XA - Unspecified injury of head, initial encounter


Status:  Acute


(3) Coagulopathy


ICD Code:  D68.9 - Coagulation defect, unspecified


(4) Hepatitis C


ICD Code:  B19.20 - Unspecified viral hepatitis C without hepatic coma


(5) Hypothyroidism


ICD Code:  E03.9 - Hypothyroidism, unspecified


Assessment and Plan


1.  Syncope: Patient developed acute dizziness followed by a syncopal episode.  

She fell and hit her head.  This may have been precipitated by dehydration from 

high-dose diuretic therapy.  Monitor intake/output.  Patient remains 

hypotensive.  Serial cardiac enzymes are negative.  Echocardiogram ordered, but 

not done yet.  Consult cardiology.  Continue IV fluids.


2.  Hypotension: Uncertain etiology.  Diuretics are on hold.  Continue IV 

fluids.  Monitor blood pressure.


3.  Head injury: Scalp laceration sutured.  CT of the head is negative.  CT of 

the cervical spine shows no acute fracture.


4.  Coagulopathy: Monitor INR.  Not on anticoagulation.


5.  History of hepatitis C: Status post interferon treatment.  Diuretics on 

hold secondary to syncope, dehydration.


6.  Hypothyroidism: TSH significantly elevated.  Synthroid dose increased.  

Follow-up with PCP and recheck TSH in 4-6 weeks.


7.  DVT prophylaxis: SCDs, KINGS hose.  Avoid chemical prophylaxis secondary to 

coagulopathy.


Discharge Planning


Pending further workup, clearance by cardiology.





Problem Qualifiers





(1) Syncope:  


Qualified Codes:  R55 - Syncope and collapse


(2) Head injury, acute:  


Qualified Codes:  S09.90XA - Unspecified injury of head, initial encounter








Poncho Marcos MD Isaías 3, 2018 14:00

## 2018-06-03 NOTE — EKG
Date Performed: 06/02/2018       Time Performed: 16:09:33

 

PTAGE:      63 years

 

EKG:      Sinus rhythm 

 

 POSSIBLE LEFT ATRIAL ENLARGEMENT RIGHT BUNDLE BRANCH BLOCK Compared to previous tracing, the Right b
undle branch block and atrial abnormality is new. ABNORMAL ECG

 

PREVIOUS TRACING       : 11/13/2014 10.24

 

DOCTOR:   Orlando Borrego  Interpretating Date/Time  06/03/2018 17:32:02

## 2018-06-04 VITALS
OXYGEN SATURATION: 95 % | DIASTOLIC BLOOD PRESSURE: 55 MMHG | SYSTOLIC BLOOD PRESSURE: 80 MMHG | HEART RATE: 66 BPM | RESPIRATION RATE: 16 BRPM | TEMPERATURE: 98.5 F

## 2018-06-04 VITALS
TEMPERATURE: 97 F | OXYGEN SATURATION: 97 % | RESPIRATION RATE: 20 BRPM | DIASTOLIC BLOOD PRESSURE: 51 MMHG | SYSTOLIC BLOOD PRESSURE: 91 MMHG | HEART RATE: 57 BPM

## 2018-06-04 VITALS
DIASTOLIC BLOOD PRESSURE: 51 MMHG | HEART RATE: 68 BPM | RESPIRATION RATE: 17 BRPM | SYSTOLIC BLOOD PRESSURE: 82 MMHG | TEMPERATURE: 98.5 F | OXYGEN SATURATION: 93 %

## 2018-06-04 VITALS
OXYGEN SATURATION: 95 % | SYSTOLIC BLOOD PRESSURE: 84 MMHG | DIASTOLIC BLOOD PRESSURE: 50 MMHG | RESPIRATION RATE: 18 BRPM | HEART RATE: 65 BPM | TEMPERATURE: 97.4 F

## 2018-06-04 VITALS — HEART RATE: 62 BPM

## 2018-06-04 VITALS — HEART RATE: 58 BPM

## 2018-06-04 LAB
BASOPHILS # BLD AUTO: 0 TH/MM3 (ref 0–0.2)
BASOPHILS NFR BLD: 1.1 % (ref 0–2)
BUN SERPL-MCNC: 8 MG/DL (ref 7–18)
CALCIUM SERPL-MCNC: 8.6 MG/DL (ref 8.5–10.1)
CHLORIDE SERPL-SCNC: 110 MEQ/L (ref 98–107)
CREAT SERPL-MCNC: 0.63 MG/DL (ref 0.5–1)
EOSINOPHIL # BLD: 0.1 TH/MM3 (ref 0–0.4)
EOSINOPHIL NFR BLD: 3.3 % (ref 0–4)
ERYTHROCYTE [DISTWIDTH] IN BLOOD BY AUTOMATED COUNT: 13.9 % (ref 11.6–17.2)
GFR SERPLBLD BASED ON 1.73 SQ M-ARVRAT: 95 ML/MIN (ref 89–?)
GLUCOSE SERPL-MCNC: 82 MG/DL (ref 74–106)
HCO3 BLD-SCNC: 18.8 MEQ/L (ref 21–32)
HCT VFR BLD CALC: 43.6 % (ref 35–46)
HGB BLD-MCNC: 15.1 GM/DL (ref 11.6–15.3)
LYMPHOCYTES # BLD AUTO: 0.4 TH/MM3 (ref 1–4.8)
LYMPHOCYTES NFR BLD AUTO: 9.5 % (ref 9–44)
MCH RBC QN AUTO: 34.8 PG (ref 27–34)
MCHC RBC AUTO-ENTMCNC: 34.5 % (ref 32–36)
MCV RBC AUTO: 100.6 FL (ref 80–100)
MONOCYTE #: 0.4 TH/MM3 (ref 0–0.9)
MONOCYTES NFR BLD: 9.6 % (ref 0–8)
NEUTROPHILS # BLD AUTO: 3 TH/MM3 (ref 1.8–7.7)
NEUTROPHILS NFR BLD AUTO: 76.5 % (ref 16–70)
PLATELET # BLD: 117 TH/MM3 (ref 150–450)
PMV BLD AUTO: 10.4 FL (ref 7–11)
RBC # BLD AUTO: 4.33 MIL/MM3 (ref 4–5.3)
SODIUM SERPL-SCNC: 140 MEQ/L (ref 136–145)
WBC # BLD AUTO: 4 TH/MM3 (ref 4–11)

## 2018-06-04 RX ADMIN — LEVOTHYROXINE SODIUM SCH MCG: 75 TABLET ORAL at 05:34

## 2018-06-04 RX ADMIN — ACETAMINOPHEN PRN MG: 325 TABLET ORAL at 05:35

## 2018-06-04 RX ADMIN — PHENYTOIN SODIUM SCH MLS/HR: 50 INJECTION INTRAMUSCULAR; INTRAVENOUS at 11:02

## 2018-06-04 RX ADMIN — Medication SCH ML: at 09:31

## 2018-06-04 RX ADMIN — PHENYTOIN SODIUM SCH MLS/HR: 50 INJECTION INTRAMUSCULAR; INTRAVENOUS at 01:02

## 2018-06-04 RX ADMIN — STANDARDIZED SENNA CONCENTRATE AND DOCUSATE SODIUM SCH TAB: 8.6; 5 TABLET, FILM COATED ORAL at 09:32

## 2018-06-04 NOTE — ECHRPT
Indication: 

 

 CONCLUSIONS 

 Normal left ventricular size and wall thickness. The left ventricular systolic function is normal wi
th an  

 estimated ejection fraction in the range of 60-65%.  Normal wall motion. 

 

 Mild mitral valve regurgitation.  

 

 Moderate calcification of the noncoronary cusp of the aortic valve. 

 

 There is moderate to severe tricuspid valve regurgitation.  

 The estimated pulmonary arterial pressure is 25 mmHg.  

 

 

 BP:        /         HR:                          Rhythm: 

 

 MEASUREMENTS  (Male / Female) Normal Values       Technical Quality: 

 2D ECHO 

 LV Diastolic Diameter PLAX        4.3 cm                4.2 - 5.9 / 3.9 - 5.3 cm 

 LV Systolic Diameter PLAX         2.9 cm                 

 IVS Diastolic Thickness           0.6 cm                0.6 - 1.0 / 0.6 - 0.9 cm 

 LVPW Diastolic Thickness          0.7 cm                0.6 - 1.0 / 0.6 - 0.9 cm 

 LV Relative Wall Thickness        0.3                    

 RV Internal Dim ED PLAX           2.3 cm                 

 

 DOPPLER 

 MR Peak Velocity                  493.0 cm/s             

 MR Peak Gradient                  97.2 mmHg              

 TR Peak Velocity                  223.0 cm/s             

 TR Peak Gradient                  19.9 mmHg              

 Right Atrial Pressure             5.0 mmHg               

 Pulmonary Artery Systolic Pressu  24.9 mmHg              

 Right Ventricular Systolic Press  24.9 mmHg              

 

 

 FINDINGS 

 

 LEFT VENTRICLE 

 Normal left ventricular size and wall thickness. The left ventricular systolic function is normal wi
th an  

 estimated ejection fraction in the range of 60-65%.  Normal wall motion. 

 

 RIGHT VENTRICLE 

 Normal right ventricular size and systolic function.   

 

 LEFT ATRIUM 

 The left atrial size is normal.   

 

 RIGHT ATRIUM 

 The right atrial size is normal.   

 

 ATRIAL SEPTUM 

 Normal atrial septal thickness without atrial level shunting by limited color doppler interrogation.
   

 

 AORTA 

 The aortic root and proximal ascending aorta are normal in size on limited imaging.   

 

 MITRAL VALVE 

 Mild mitral valve regurgitation.  

 

 AORTIC VALVE 

 Moderate calcification of the noncoronary cusp of the aortic valve. 

 

 TRICUSPID VALVE 

 There is moderate to severe tricuspid valve regurgitation.  

 The estimated pulmonary arterial pressure is 25 mmHg.  

 

 PULMONARY VALVE 

 No pulmonary valve regurgitation or stenosis.   

 

 VESSELS 

 The inferior vena cava is normal in size.   

 

 PERICARDIUM 

 No pericardial effusion.   

 

 

 

 

  Danilo Menjivar MD 

  (Electronically Signed) 

  Final Date:04 June 2018 12:48

## 2018-06-04 NOTE — MB
cc:

Ethan Barney MD

****

 

 

DATE:

06/03/2018

 

REASON FOR CONSULTATION:

Evaluation of syncope.

 

HISTORY OF PRESENT ILLNESS:

Veronica Olguin is a 63-year-old female with a history of cirrhosis.  

She has had varices.  She was at work at [employer].  She was at the end

of her shift, going to her locker and felt dizziness and ended up on 

the floor.  She is on very high doses of diuretics.  She has not been 

to a doctor in over a year and was switching doctors because she is 

now [employer].  Her thyroid medicine got reduced to 50 mcg and she had 

not had it checked in a long time and I see that her TSH is very 

elevated and her free T3 is low.  She is running a low blood pressure.

 She is on Lasix 80 b.i.d. and Aldactone 50 b.i.d. for her liver 

disease.  There is no history of heart disease that we know of.  She 

does not have angina.  She does not have CHF.  Chest x-ray, size is 

normal.  She has not had palpitations.  She smokes about a pack a week

according to her.  There is no family history of heart disease except 

mitral valve prolapse in her mother.

 

PAST MEDICAL HISTORY:

Includes cirrhosis of the liver with varices, hypothyroidism.

 

PAST SURGICAL HISTORY:

Includes tonsillectomy, breast biopsy.

 

FAMILY HISTORY:

Positive for mitral prolapse in the mother.  Negative for coronary 

artery disease.

 

SOCIAL HISTORY:

She occasionally has a glass of wine at night.  She smokes about a 

pack a week.

 

REVIEW OF SYSTEMS:

Otherwise negative.

 

PHYSICAL EXAMINATION:

GENERAL:  Talkative, adequately developed white female, appears stated

age.

VITAL SIGNS:  Charted.  Her blood pressures are running low, in the 

70s and 80s.

HEENT:  Unremarkable.

NECK:  Shows no JVD.  No bruits.

CHEST:  Clear to auscultation.

CARDIOVASCULAR:  Nondisplaced PMI.  First and second heart sounds 

normal.  Regular rate and rhythm.  There is a 2/6 systolic ejection 

murmur.

ABDOMEN:  Very slightly distended, but I do not detect ascites, could 

not palpate a liver edge.  Soft, no masses.

EXTREMITIES:  Reveal no clubbing, cyanosis or edema.  Peripheral 

pulses intact.

 

ECHOCARDIOGRAM:

Shows sinus rhythm with a right bundle branch block.

 

IMAGING:

Chest x-ray shows normal heart size, no CHF.  Radiologist read minimal

bibasilar atelectasis and/or scarring.

 

LABORATORY DATA:

Troponins have been negative.  Creatinine is normal.  Albumin is low. 

TSH is very high at 83.8.

 

IMPRESSION:

Syncope, likely related to thyroid and volume status.  I do not see 

evidence for heart disease.  She has A 2D echo that is pending.  I 

will be available, but I will not be following.  Please call if there 

is any questions.

 

 

__________________________________

MD KIMBER Machuca/CORTEZ

D: 06/03/2018, 06:16 PM

T: 06/03/2018, 08:43 PM

Visit #: [id number]

Job #: [id number]

## 2018-06-04 NOTE — PD.CARD.PN
Subjective


Subjective Remarks


no complaints





Objective


Medications





Current Medications








 Medications


  (Trade)  Dose


 Ordered  Sig/Herbert


 Route  Start Time


 Stop Time Status Last Admin


 


  (Narcan Inj)  0.4 mg  UNSCH  PRN


 IV PUSH  6/2/18 18:45


     


 


 


 Sodium Chloride  1,000 ml @ 


 100 mls/hr  Q10H


 IV  6/2/18 19:02


    6/3/18 15:02


 


 


  (NS Flush)  2 ml  UNSCH  PRN


 IV FLUSH  6/2/18 19:15


     


 


 


  (NS Flush)  2 ml  BID


 IV FLUSH  6/2/18 21:00


     


 


 


  (Tylenol)  650 mg  Q6H  PRN


 PO  6/2/18 19:15


    6/4/18 05:35


 


 


  (Roxicodone)  10 mg  Q4H  PRN


 PO  6/2/18 19:15


     


 


 


  (Roxicodone)  5 mg  Q4H  PRN


 PO  6/2/18 19:15


     


 


 


  (Ingris-Colace)  1 tab  BID


 PO  6/2/18 21:00


     


 


 


  (Milk Of


 Magnesia Liq)  30 ml  Q12H  PRN


 PO  6/2/18 19:15


     


 


 


  (Senokot)  17.2 mg  Q12H  PRN


 PO  6/2/18 19:15


     


 


 


  (Dulcolax Supp)  10 mg  DAILY  PRN


 RECTAL  6/2/18 19:15


     


 


 


  (Lactulose Liq)  30 ml  DAILY  PRN


 PO  6/2/18 19:15


     


 


 


  (Synthroid)  75 mcg  DAILY@0600


 PO  6/3/18 06:00


    6/4/18 05:34


 


 


  (Antivert)  25 mg  Q8H  PRN


 PO  6/2/18 20:15


     


 








Vital Signs / I&O





Vital Signs








  Date Time  Temp Pulse Resp B/P (MAP) Pulse Ox O2 Delivery O2 Flow Rate FiO2


 


6/4/18 08:17 97.4 65 18 84/50 (61) 95   


 


6/4/18 05:13 98.5 68 17 82/51 (61) 93   


 


6/4/18 00:50 98.5 66 16 80/55 (63) 95   


 


6/3/18 20:34 98.4 60 15 86/62 (70) 97   


 


6/3/18 17:20 98.2  20 83/45 (58) 98   


 


6/3/18 12:00 98.1 65 17 78/51 (60) 94   














I/O      


 


 6/3/18 6/3/18 6/3/18 6/4/18 6/4/18 6/4/18





 07:00 15:00 23:00 07:00 15:00 23:00


 


Intake Total 2200 ml     


 


Balance 2200 ml     


 


      


 


Intake IV Total 2200 ml     


 


# Voids 2     

















Ethan Barney MD Jun 4, 2018 09:31

## 2018-06-04 NOTE — HHI.DCPOC
Discharge Care Plan


Diagnosis:  


(1) Syncope


(2) Hypotension


(3) Dehydration


(4) Hypothyroidism


Goals to Promote Your Health


* To prevent worsening of your condition and complications


* To maintain your health at the optimal level


Directions to Meet Your Goals


*** Take your medications as prescribed


*** Follow your dietary instruction


*** Follow activity as directed








*** Keep your appointments as scheduled


*** Take your immunizations and boosters as scheduled


*** If your symptoms worsen call your PCP, if no PCP go to Urgent Care Center 

or Emergency Room***


*** Smoking is Dangerous to Your Health. Avoid second hand smoke***


***Call the 24-hour hour crisis hotline for domestic abuse at 1-544.220.1459***











Natalee Johnson PA-C Jun 4, 2018 2:07 pm

## 2018-06-04 NOTE — HHI.PR
Subjective


Remarks


Follow up syncope, hypotension.  The patient has no complaints at this time.  

Lightheadedness is much better.  She has been ambulating in the leal without 

difficulty.  She wants to go home.  She denies chest pain or dyspnea.





Objective


Vitals





Vital Signs








  Date Time  Temp Pulse Resp B/P (MAP) Pulse Ox O2 Delivery O2 Flow Rate FiO2


 


6/4/18 12:54  58      


 


6/4/18 11:12 97.0 57 20 91/51 (64) 97   


 


6/4/18 08:17 97.4 65 18 84/50 (61) 95   


 


6/4/18 07:40  62      


 


6/4/18 05:13 98.5 68 17 82/51 (61) 93   


 


6/4/18 00:50 98.5 66 16 80/55 (63) 95   


 


6/3/18 20:34 98.4 60 15 86/62 (70) 97   


 


6/3/18 17:20 98.2  20 83/45 (58) 98   














I/O      


 


 6/3/18 6/3/18 6/3/18 6/4/18 6/4/18 6/4/18





 07:00 15:00 23:00 07:00 15:00 23:00


 


Intake Total 2200 ml    1000 ml 


 


Balance 2200 ml    1000 ml 


 


      


 


Intake IV Total 2200 ml    1000 ml 


 


# Voids 2    1 








Result Diagram:  


6/4/18 1030                                                                    

            6/4/18 1030





Imaging





Last Impressions








Head CT 6/2/18 1612 Signed





Impressions: 





 CONCLUSION:





 1.  No acute intracranial abnormality.





 2.  Left parietal scalp hematoma.





  





 


 


Chest X-Ray 6/2/18 1612 Signed





Impressions: 





 CONCLUSION: 





 1.  Minimal bibasilar atelectasis and/or scarring.





 2.  No acute focal infiltrate or pulmonary vascular congestion.





 3.  Mild degenerative changes and scoliosis of the thoracic spine.





  





 


 


Cervical Spine CT 6/2/18 0000 Signed





Impressions: 





 CONCLUSION:





 1.  No acute fracture or prevertebral soft tissue swelling.





 2.  Mild spinal stenosis and moderate bilateral foraminal narrowing at C5-6.





 3.  Moderate bilateral foraminal narrowing at C4-5 and C6-7 without spinal sten





 osis.





 4.  Straightening of normal cervical lordosis.





 5.  Scoliosis of the cervical spine.





 6.  Cervical spondylosis at C4-5, C5-6 and C6-7.





  





 








Objective Remarks


General: No acute distress.


HEENT: Scalp laceration sutured.


Heart: Regular rate and rhythm. 2/6 murmur.


Lungs: Clear to auscultation bilaterally. No wheezes, rales, or rhonchi. 

Breathing is nonlabored.


Abdomen: Soft, nontender, nondistended.


Extremities: No lower extremity edema.


Psych: Alert and oriented.


Neuro: Normal speech.  No focal deficits noted.


Procedures


none


Urinary Catheter:  No


Vascular Central Line Catheter:  No





A/P


Problem List:  


(1) Syncope


ICD Code:  R55 - Syncope and collapse


Status:  Acute


(2) Head injury, acute


ICD Code:  S09.90XA - Unspecified injury of head, initial encounter


Status:  Acute


(3) Coagulopathy


ICD Code:  D68.9 - Coagulation defect, unspecified


(4) Hepatitis C


ICD Code:  B19.20 - Unspecified viral hepatitis C without hepatic coma


(5) Hypothyroidism


ICD Code:  E03.9 - Hypothyroidism, unspecified


Assessment and Plan


1.  Syncope: Patient developed acute dizziness followed by a syncopal episode.  

She fell and hit her head.  This may have been precipitated by dehydration from 

high-dose diuretic therapy.  Monitor intake/output.  Patient remains 

hypotensive.  Serial cardiac enzymes are negative.  Echocardiogram shows 

tricuspid regurgitation.  Ejection fraction 60-65%.  Discussed with Dr. Barney.

  No further cardiac workup necessary at this time.


2.  Hypotension: Uncertain etiology.  Diuretics are on hold.  Monitor blood 

pressure.  The patient's blood pressure actually improved upon standing with 

physical therapy.


3.  Head injury: Scalp laceration sutured.  CT of the head is negative.  CT of 

the cervical spine shows no acute fracture.


4.  Coagulopathy: Monitor INR.  Not on anticoagulation.


5.  History of hepatitis C: Status post interferon treatment.  Diuretics on 

hold secondary to syncope, dehydration.


6.  Hypothyroidism: TSH significantly elevated.  Synthroid dose increased.  

Follow-up with PCP and recheck TSH in 4-6 weeks.


7.  DVT prophylaxis: SCDs, KINGS hose.  Avoid chemical prophylaxis secondary to 

coagulopathy.


Discharge Planning


Discharge home in stable condition.  Follow-up with PCP.  Heart healthy diet.  

Activity as tolerated.





Problem Qualifiers





(1) Syncope:  


Qualified Codes:  R55 - Syncope and collapse


(2) Head injury, acute:  


Qualified Codes:  S09.90XA - Unspecified injury of head, initial encounter








Poncho Marcos MD Jun 4, 2018 14:48